# Patient Record
Sex: MALE | HISPANIC OR LATINO | Employment: FULL TIME | URBAN - METROPOLITAN AREA
[De-identification: names, ages, dates, MRNs, and addresses within clinical notes are randomized per-mention and may not be internally consistent; named-entity substitution may affect disease eponyms.]

---

## 2017-04-20 ENCOUNTER — NON-PROVIDER VISIT (OUTPATIENT)
Dept: URGENT CARE | Facility: PHYSICIAN GROUP | Age: 22
End: 2017-04-20

## 2017-04-20 DIAGNOSIS — Z02.1 PRE-EMPLOYMENT DRUG SCREENING: ICD-10-CM

## 2017-04-20 LAB
AMP AMPHETAMINE: NORMAL
COC COCAINE: NORMAL
INT CON NEG: NEGATIVE
INT CON POS: POSITIVE
MET METHAMPHETAMINES: NORMAL
OPI OPIATES: NORMAL
PCP PHENCYCLIDINE: NORMAL
POC DRUG COMMENT 753798-OCCUPATIONAL HEALTH: NORMAL
THC: NORMAL

## 2017-04-20 PROCEDURE — 80305 DRUG TEST PRSMV DIR OPT OBS: CPT | Performed by: EMERGENCY MEDICINE

## 2018-04-04 ENCOUNTER — HOSPITAL ENCOUNTER (OUTPATIENT)
Facility: MEDICAL CENTER | Age: 23
End: 2018-04-04
Attending: INTERNAL MEDICINE
Payer: COMMERCIAL

## 2018-04-04 ENCOUNTER — OFFICE VISIT (OUTPATIENT)
Dept: URGENT CARE | Facility: PHYSICIAN GROUP | Age: 23
End: 2018-04-04
Payer: COMMERCIAL

## 2018-04-04 VITALS
HEIGHT: 71 IN | OXYGEN SATURATION: 98 % | WEIGHT: 216 LBS | SYSTOLIC BLOOD PRESSURE: 114 MMHG | DIASTOLIC BLOOD PRESSURE: 68 MMHG | HEART RATE: 78 BPM | BODY MASS INDEX: 30.24 KG/M2

## 2018-04-04 DIAGNOSIS — N34.2 URETHRITIS: ICD-10-CM

## 2018-04-04 LAB
APPEARANCE UR: CLEAR
BILIRUB UR STRIP-MCNC: NEGATIVE MG/DL
COLOR UR AUTO: NORMAL
GLUCOSE UR STRIP.AUTO-MCNC: NEGATIVE MG/DL
KETONES UR STRIP.AUTO-MCNC: NEGATIVE MG/DL
LEUKOCYTE ESTERASE UR QL STRIP.AUTO: NEGATIVE
NITRITE UR QL STRIP.AUTO: NEGATIVE
PH UR STRIP.AUTO: 6 [PH] (ref 5–8)
PROT UR QL STRIP: NEGATIVE MG/DL
RBC UR QL AUTO: NEGATIVE
SP GR UR STRIP.AUTO: 1.02
UROBILINOGEN UR STRIP-MCNC: NEGATIVE MG/DL

## 2018-04-04 PROCEDURE — 87591 N.GONORRHOEAE DNA AMP PROB: CPT

## 2018-04-04 PROCEDURE — 99000 SPECIMEN HANDLING OFFICE-LAB: CPT | Performed by: INTERNAL MEDICINE

## 2018-04-04 PROCEDURE — 99203 OFFICE O/P NEW LOW 30 MIN: CPT | Performed by: INTERNAL MEDICINE

## 2018-04-04 PROCEDURE — 87491 CHLMYD TRACH DNA AMP PROBE: CPT

## 2018-04-04 PROCEDURE — 81002 URINALYSIS NONAUTO W/O SCOPE: CPT | Performed by: INTERNAL MEDICINE

## 2018-04-04 ASSESSMENT — ENCOUNTER SYMPTOMS
PALPITATIONS: 0
NAUSEA: 0
WEIGHT LOSS: 0
COUGH: 0
HEADACHES: 0
SORE THROAT: 0
DIZZINESS: 0
EYES NEGATIVE: 1
SHORTNESS OF BREATH: 0
DIARRHEA: 0
FEVER: 0
CHILLS: 0
VOMITING: 0
BLOOD IN STOOL: 0
MYALGIAS: 0
CONSTITUTIONAL NEGATIVE: 1

## 2018-04-05 LAB
C TRACH DNA SPEC QL NAA+PROBE: NEGATIVE
N GONORRHOEA DNA SPEC QL NAA+PROBE: NEGATIVE
SPECIMEN SOURCE: NORMAL

## 2018-04-05 NOTE — PROGRESS NOTES
"Shady Upton is a 23 y.o. male who presents for Exposure to STD (Was Dx with STI X 3 months ago, having reoccuring symptoms )       Patient is a 23-year-old male who presents today with penile discharge. Patient was recently diagnosed with chlamydia. Patient had completed a course of Zithromax and now has recurrent type symptoms. This is been several months after the initial diagnosis. Patient states that he has not had unprotected sex. Patient denies any fever or shaking chills. No other symptoms.      PMH:  has no past medical history on file.  MEDS:   Current Outpatient Prescriptions:   •  azithromycin (ZMAX) 2 GM suspension, Take 2 g by mouth Once for 1 dose., Disp: 2 Each, Rfl: 0  ALLERGIES: No Known Allergies  SURGHX: History reviewed. No pertinent surgical history.  SOCHX:  reports that he has never smoked. He has never used smokeless tobacco.  FH: family history is not on file.    Review of Systems   Constitutional: Negative.  Negative for chills, fever and weight loss.   HENT: Negative for sore throat.    Eyes: Negative.    Respiratory: Negative for cough and shortness of breath.    Cardiovascular: Negative for chest pain, palpitations and leg swelling.   Gastrointestinal: Negative for blood in stool, diarrhea, nausea and vomiting.   Genitourinary: Negative for dysuria, frequency and urgency.   Musculoskeletal: Negative for myalgias.   Skin: Negative for rash.   Neurological: Negative for dizziness (negative headache) and headaches.     No Known Allergies   Objective:   /68   Pulse 78   Ht 1.803 m (5' 11\")   Wt 98 kg (216 lb)   SpO2 98%   BMI 30.13 kg/m²   Physical Exam   Constitutional: He is oriented to person, place, and time. He appears well-developed and well-nourished. He is active. No distress.   HENT:   Head: Normocephalic and atraumatic.   Right Ear: External ear normal.   Left Ear: External ear normal.   Mouth/Throat: Oropharynx is clear and moist. No oropharyngeal exudate. "   Eyes: Conjunctivae and EOM are normal. Pupils are equal, round, and reactive to light. Right eye exhibits no discharge. Left eye exhibits no discharge. No scleral icterus.   Neck: Normal range of motion. Neck supple. No JVD present. Carotid bruit is not present. No thyroid mass and no thyromegaly present.   Cardiovascular: Normal rate, regular rhythm, S1 normal, S2 normal and normal heart sounds.  Exam reveals no friction rub.    No murmur heard.  Pulmonary/Chest: Effort normal and breath sounds normal. No respiratory distress. He has no wheezes. He has no rales.   Abdominal: Soft. Bowel sounds are normal. He exhibits no distension and no mass. There is no hepatosplenomegaly. There is no tenderness. There is no rebound and no guarding.   Genitourinary: Testes normal. Uncircumcised. Discharge (watery) found.   Musculoskeletal: Normal range of motion. He exhibits no edema.        Cervical back: Normal.   Lymphadenopathy:        Head (right side): No submental, no submandibular and no occipital adenopathy present.        Head (left side): No submental, no submandibular and no occipital adenopathy present.     He has no cervical adenopathy.   Neurological: He is alert and oriented to person, place, and time. He has normal strength. No cranial nerve deficit.   Skin: Skin is warm and dry. No rash noted. No erythema.   Psychiatric: He has a normal mood and affect. His behavior is normal. Thought content normal.         Assessment/Plan:   Assessment    1. Urethritis  - azithromycin (ZMAX) 2 GM suspension; Take 2 g by mouth Once for 1 dose.  Dispense: 2 Each; Refill: 0  - POCT Urinalysis  - CHLAMYDIA/GC PCR URINE OR SWAB; Future  Differential diagnosis, natural history, supportive care, and indications for immediate follow-up discussed.  Patient refrain from any unprotected sex. Patient to use condoms.  We'll treat empirically. Cultures sent.

## 2018-04-10 ENCOUNTER — TELEPHONE (OUTPATIENT)
Dept: URGENT CARE | Facility: PHYSICIAN GROUP | Age: 23
End: 2018-04-10

## 2018-04-10 NOTE — TELEPHONE ENCOUNTER
1. Caller Name: Shady Upton                                         Call Back Number: [phone] (home)       Patient approves a detailed voicemail message: yes    Pt called stating that when he went to  his medication from the pharmacy they did not have it.  It needed to be special ordered and take a week to get it.  The patient checked back today and the pharmacy told him that the medication had termed out.  His is asking if we could please resend the Rx to the pharmacy so they can fill it.  Please Advise.  Thanks

## 2018-04-16 NOTE — TELEPHONE ENCOUNTER
Pt called again to day to check on status of the medication.  I called over to the pharmacy and they stated that the medication prescribed is not available and has been discontinued so they were unable to fill it.  They state we need to send over a new Rx for him.  Pt states he tested positive for chlamydia and was prescribed the liquid because he was already treated from [facility] that did not work.   He is able to take pills and does not need liquid if there is something else that can be called in.

## 2018-04-18 ENCOUNTER — TELEPHONE (OUTPATIENT)
Dept: URGENT CARE | Facility: PHYSICIAN GROUP | Age: 23
End: 2018-04-18

## 2018-04-18 NOTE — TELEPHONE ENCOUNTER
I discussed pt's negative lab results and prescriptions with Dr. Lindsey. At his request I called pt and gave him the results and advised him he did not need to take antibiotics.     Pt was also advised that should symptoms present again he would need to be reevaluated.     Pt stated he understood and had no further questions.

## 2018-12-17 ENCOUNTER — NON-PROVIDER VISIT (OUTPATIENT)
Dept: URGENT CARE | Facility: PHYSICIAN GROUP | Age: 23
End: 2018-12-17

## 2018-12-17 DIAGNOSIS — Z02.1 PRE-EMPLOYMENT DRUG SCREENING: ICD-10-CM

## 2018-12-17 LAB
AMP AMPHETAMINE: NORMAL
COC COCAINE: NORMAL
INT CON NEG: NEGATIVE
INT CON POS: POSITIVE
MET METHAMPHETAMINES: NORMAL
OPI OPIATES: NORMAL
PCP PHENCYCLIDINE: NORMAL
POC DRUG COMMENT 753798-OCCUPATIONAL HEALTH: NEGATIVE
THC: NORMAL

## 2018-12-17 PROCEDURE — 80305 DRUG TEST PRSMV DIR OPT OBS: CPT | Performed by: FAMILY MEDICINE

## 2019-04-16 ENCOUNTER — OCCUPATIONAL MEDICINE (OUTPATIENT)
Dept: URGENT CARE | Facility: PHYSICIAN GROUP | Age: 24
End: 2019-04-16
Payer: COMMERCIAL

## 2019-04-16 VITALS
WEIGHT: 210 LBS | HEIGHT: 70 IN | SYSTOLIC BLOOD PRESSURE: 128 MMHG | DIASTOLIC BLOOD PRESSURE: 66 MMHG | HEART RATE: 80 BPM | OXYGEN SATURATION: 98 % | TEMPERATURE: 98.7 F | BODY MASS INDEX: 30.06 KG/M2 | RESPIRATION RATE: 16 BRPM

## 2019-04-16 DIAGNOSIS — S61.012A LACERATION OF LEFT THUMB WITHOUT FOREIGN BODY WITHOUT DAMAGE TO NAIL, INITIAL ENCOUNTER: ICD-10-CM

## 2019-04-16 DIAGNOSIS — Z02.1 PRE-EMPLOYMENT DRUG SCREENING: ICD-10-CM

## 2019-04-16 LAB
AMP AMPHETAMINE: NORMAL
COC COCAINE: NORMAL
INT CON NEG: NORMAL
INT CON POS: NORMAL
MET METHAMPHETAMINES: NORMAL
OPI OPIATES: NORMAL
PCP PHENCYCLIDINE: NORMAL
POC DRUG COMMENT 753798-OCCUPATIONAL HEALTH: NEGATIVE
THC: NORMAL

## 2019-04-16 PROCEDURE — 12002 RPR S/N/AX/GEN/TRNK2.6-7.5CM: CPT | Mod: 29 | Performed by: PHYSICIAN ASSISTANT

## 2019-04-16 PROCEDURE — 80305 DRUG TEST PRSMV DIR OPT OBS: CPT | Mod: 29 | Performed by: PHYSICIAN ASSISTANT

## 2019-04-16 ASSESSMENT — ENCOUNTER SYMPTOMS
NEUROLOGICAL NEGATIVE: 1
ROS SKIN COMMENTS: LAC, SEE HPI
CONSTITUTIONAL NEGATIVE: 1
BRUISES/BLEEDS EASILY: 0

## 2019-04-16 NOTE — LETTER
"EMPLOYEE’S CLAIM FOR COMPENSATION/ REPORT OF INITIAL TREATMENT  FORM C-4    EMPLOYEE’S CLAIM - PROVIDE ALL INFORMATION REQUESTED   First Name  Shady Last Name  Alexis Upton Birthdate                    1995                Sex  male Claim Number   Home Address  [address] Age  24 y.o. 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 (if applicable)  cutting fabric material     How did this injury or occupational disease occur? (Be specific an answer in detail. Use additional sheet if necessary)  while cutting fabric material, I was holding on to the wood part of a cutting board, then I dont know what happend    If you believe that you have an occupational disease, when did you first have knowledge of the disability and it relationship to your employment?  N/A Witnesses to the Accident  None      Nature of Injury or Occupational Disease  Laceration  Part(s) of Body Injured or Affected  Hand (L), Thumb (L), N/A    I certify that the above is true and correct to the best of my knowledge and that I have provided this information in order " to obtain the benefits of [place]  I hereby authorize any physician, chiropractor, surgeon, practitioner, or other person, any hospital, including [facility] or [facility] hospital, any medical service organization, any insurance company, or other institution or organization to release to each other, any medical or other information, including benefits paid or payable, pertinent to this injury or disease, except information relative to diagnosis, treatment and/or counseling for AIDS, psychological conditions, alcohol or controlled substances, for which I must give specific authorization.  A Photostat of this authorization shall be as valid as the original.     Date   Place   Employee’s Signature   THIS REPORT MUST BE COMPLETED AND MAILED WITHIN 3 WORKING DAYS OF TREATMENT   Place  [facility]  Name of Facility  [place]   Date  4/16/2019 Diagnosis  (S61.012A) Laceration of left thumb without foreign body without damage to nail, initial encounter Is there evidence the injured employee was under the influence of alcohol and/or another controlled substance at the time of accident?   Hour  4:52 PM Description of Injury or Disease  The encounter diagnosis was Laceration of left thumb without foreign body without damage to nail, initial encounter. No   Treatment  -lac repair, #6 4-0 interrupted sutures with good wound approx.   -education and care discussed for home.   -tetanus UTD  -RTC in 4 days for wound check, possible full duty release for Monday.  Sooner if needed for any concerns, complications.    -signs and symptoms of infection discussed in detail.   Have you advised the patient to remain off work five days or more? No   X-Ray Findings      If Yes   From Date  To Date      From information given by the employee, together with medical evidence, can you directly connect this  "injury or occupational disease as job incurred?  Yes If No Full Duty  No Modified Duty  Yes   Is additional medical care by a physician indicated?  Yes If Modified Duty, Specify any Limitations / Restrictions  SEE D-39   Do you know of any previous injury or disease contributing to this condition or occupational disease?                            No   Date  4/16/2019 Print Doctor’s Name Primo Woodson P.A.-C. I certify the employer’s copy of  this form was mailed on:   Address  [address] [place] [address]. [address] Insurer’s Use Only     [facility]  [id number]    Provider’s Tax ID Number  [id number] Telephone  Dept: [phone]        e-PRIMO Lieberman P.A.-C.   e-Signature: Dr. Pankaj Johns, Medical Director Degree  ANA        ORIGINAL-TREATING PHYSICIAN OR CHIROPRACTOR    PAGE 2-INSURER/TPA    PAGE 3-EMPLOYER    PAGE 4-EMPLOYEE             Form C-4 (rev10/07)              BRIEF DESCRIPTION OF RIGHTS AND BENEFITS  (Pursuant to [place]RS 616C.050)    Notice of Injury or Occupational Disease (Incident Report Form C-1): If an injury or occupational disease (OD) arises out of and in the  course of employment, you must provide written notice to your employer as soon as practicable, but no later than 7 days after the accident or  OD. Your employer shall maintain a sufficient supply of the required forms.    Claim for Compensation (Form C-4): If medical treatment is sought, the form C-4 is available at the place of initial treatment. A completed  \"Claim for Compensation\" (Form C-4) must be filed within 90 days after an accident or OD. The treating physician or chiropractor must,  within 3 working days after treatment, complete and mail to the employer, the employer's insurer and third-party , the Claim for  Compensation.    Medical Treatment: If you require medical treatment for your on-the-job injury or OD, you may be required to select a physician or  chiropractor from a list provided by your " workers’ compensation insurer, if it has contracted with an Organization for Managed Care (MCO) or  Preferred Provider Organization (PPO) or providers of health care. If your employer has not entered into a contract with an MCO or PPO, you  may select a physician or chiropractor from the Panel of Physicians and Chiropractors. Any medical costs related to your industrial injury or  OD will be paid by your insurer.    Temporary Total Disability (TTD): If your doctor has certified that you are unable to work for a period of at least 5 consecutive days, or 5  cumulative days in a 20-day period, or places restrictions on you that your employer does not accommodate, you may be entitled to TTD  compensation.    Temporary Partial Disability (TPD): If the wage you receive upon reemployment is less than the compensation for TTD to which you are  entitled, the insurer may be required to pay you TPD compensation to make up the difference. TPD can only be paid for a maximum of 24  months.    Permanent Partial Disability (PPD): When your medical condition is stable and there is an indication of a PPD as a result of your injury or  OD, within 30 days, your insurer must arrange for an evaluation by a rating physician or chiropractor to determine the degree of your PPD. The  amount of your PPD award depends on the date of injury, the results of the PPD evaluation and your age and wage.    Permanent Total Disability (PTD): If you are medically certified by a treating physician or chiropractor as permanently and totally disabled  and have been granted a PTD status by your insurer, you are entitled to receive monthly benefits not to exceed 66 2/3% of your average  monthly wage. The amount of your PTD payments is subject to reduction if you previously received a PPD award.    Vocational Rehabilitation Services: You may be eligible for vocational rehabilitation services if you are unable to return to the job due to a  permanent physical  impairment or permanent restrictions as a result of your injury or occupational disease.    Transportation and Per Renee Reimbursement: You may be eligible for travel expenses and per renee associated with medical treatment.    Reopening: You may be able to reopen your claim if your condition worsens after claim closure.    Appeal Process: If you disagree with a written determination issued by the insurer or the insurer does not respond to your request, you may  appeal to the Department of Administration, , by following the instructions contained in your determination letter. You must  appeal the determination within 70 days from the date of the determination letter at [address], [address], [place], [place]  [address], or [address], [address], [place] [address]. If you disagree with the  decision, you may appeal to the  Department of Administration, . You must file your appeal within 30 days from the date of the  decision  letter at [address], [address], [place], [place] [address], or [address], [place] [address]. If you  disagree with a decision of an , you may file a petition for judicial review with the District Court. You must do so within 30  days of the Appeal Officer’s decision. You may be represented by an  at your own expense or you may contact the [facility] for possible  representation.    [place]  for Injured Workers (NAIW): If you disagree with a  decision, you may request that NAIW represent you  without charge at an  Hearing. For information regarding denial of benefits, you may contact the [facility] at: [address], [address] [address], [phone], or [address], [place] [address], [phone]    To File a Complaint with the Division: If you wish to file a complaint with  the  of the Division of Industrial Relations (DIR),  please contact the Workers’ Compensation Section, [address], [address], [place], [place] [address], telephone ([phone]) [phone], or  [address], [address], [place], [place] [address], telephone [phone]) [phone].    For assistance with Workers’ Compensation Issues: you may contact the Office of the Governor Consumer Health Assistance, [address] [place] [address], [address], [place], [place] [address], Toll Free 1-451.904.8068, Web site: [email or web address] [email or web address]., E-mail  [email or web address]                                                                                                                                                                                                                                   __________________________________________________________________                                                                   _________________                Employee Name / Signature                                                                                                                                                       Date                                                                                                                                                                                                     D-2 (rev. 10/07)

## 2019-04-16 NOTE — LETTER
[facility]  [address] [place] [address]. [address] - [place] [address]  Phone:  [phone] - Fax:  [phone]   [facility] Progress Report and Disability Certification  Date of Service: 4/16/2019   No Show:  No  Date / Time of Next Visit: 4/20/2019 at 11:00 AM   Claim Information   Patient Name: Shady Upton  Claim Number:     Employer: [employer]  Date of Injury: 4/16/2019     Insurer / TPA:    ID / SSN:     Occupation: Manufactorer   Diagnosis: The encounter diagnosis was Laceration of left thumb without foreign body without damage to nail, initial encounter.    Medical Information   Related to Industrial Injury? Yes    Subjective Complaints:  DOI: 04/16/19.  1530.  Left thumb.    While cutting fabric material the patient was holding a wood part of a cutting board and states his hand slipped and cut the base of his thumb.  He had immediate pain and quite a bit of bleeding that was attempted to be controlled on site with wraps and pressure.  He notes that he has FROM of the thumb, no numbness/tingling or weakness.   He is UTD on his tetanus.    Objective Findings: Vitals reviewed  Left thumb/hand:  Mildly bleeding 2.5 cm thumb dorsal-medial thumb just distal to MCP joint.  No evidence of tendon involvement on examination. Patient has full ROM of thumb including flexion, extension, and thumb opposition with resistance.  Distal CMS WNL   Pre-Existing Condition(s):     Assessment:   Initial Visit    Status: Additional Care Required  Permanent Disability:No    Plan:      Diagnostics:      Comments:       Disability Information   Status: Released to Restricted Duty    From:  4/16/2019  Through: 4/20/2019 Restrictions are: Temporary   Physical Restrictions   Sitting:    Standing:    Stooping:    Bending:      Squatting:    Walking:    Climbing:    Pushing:      Pulling:    Other:    Reaching Above Shoulder (L):   Reaching Above Shoulder (R):      Reaching Below Shoulder (L):    Reaching Below Shoulder (R):      Not to exceed Weight Limits   Carrying(hrs):   Weight Limit(lb): < or = to 10 pounds Lifting(hrs):   Weight  Limit(lb): < or = to 10 pounds   Comments: -lac repair, #6 4-0 interrupted sutures with good wound approx.   -education and care discussed for home.   -tetanus UTD  -RTC in 4 days for wound check, possible full duty release for Monday.  Sooner if needed for any concerns, complications.    -signs and symptoms of infection discussed in detail.     Repetitive Actions   Hands: i.e. Fine Manipulations from [place] hrs/day  Comments:LEFT HAND   Feet: i.e. Operating Foot Controls:     Driving / Operate Machinery:     Physician Name: Primo Woodson P.A.-C. Physician Signature: PRIMO William P.A.-C. e-Signature: Dr. Pankaj Johns, Medical Director   Clinic Name / Location: [address]  [place] [address] Clinic Phone Number: Dept: [phone]   Appointment Time: 4:10 Pm Visit Start Time: 4:52 PM   Check-In Time:  4:20 Pm Visit Discharge Time: 5:35 PM   Original-Treating Physician or Chiropractor    Page 2-Insurer/TPA    Page 3-Employer    Page 4-Employee

## 2019-04-17 NOTE — PROGRESS NOTES
"Subjective:      Shady Upton is a 24 y.o. male who presents with Laceration (Left thumb)      DOI: 04/16/19.  1530.  Left thumb.    While cutting fabric material the patient was holding a wood part of a cutting board and states his hand slipped and cut the base of his thumb.  He had immediate pain and quite a bit of bleeding that was attempted to be controlled on site with wraps and pressure.  He notes that he has FROM of the thumb, no numbness/tingling or weakness.   He is UTD on his tetanus.      Laceration      as above.     Review of Systems   Constitutional: Negative.    Musculoskeletal:        SEE HPI   Skin:        LAC, SEE HPI   Neurological: Negative.    Endo/Heme/Allergies: Does not bruise/bleed easily.       PMH:  has no past medical history on file.  MEDS: No current outpatient prescriptions on file.  ALLERGIES: No Known Allergies  SURGHX: No past surgical history on file.  SOCHX:  reports that he has never smoked. He has never used smokeless tobacco.  FH: Family history was reviewed, no pertinent findings to report   Objective:     /66   Pulse 80   Temp 37.1 °C (98.7 °F) (Temporal)   Resp 16   Ht 1.778 m (5' 10\")   Wt 95.3 kg (210 lb)   SpO2 98%   BMI 30.13 kg/m²      Physical Exam   Constitutional: He is oriented to person, place, and time. He appears well-developed and well-nourished. No distress.   Cardiovascular: Normal rate.    Pulmonary/Chest: Effort normal.   Neurological: He is alert and oriented to person, place, and time. Abnormal reflex:    Abnormal coordination:      Skin: Skin is warm and dry.   Psychiatric: He has a normal mood and affect. His behavior is normal.     Vitals reviewed  Left thumb/hand:  Mildly bleeding 2.5 cm thumb dorsal-medial thumb just distal to MCP joint.  No evidence of tendon involvement on examination. Patient has full ROM of thumb including flexion, extension, and thumb opposition with resistance.  Distal CMS WNL       Assessment/Plan:     1. " Laceration of left thumb without foreign body without damage to nail, initial encounter           -lac repair, #6 4-0 interrupted sutures with good wound approx.   -education and care discussed for home.   -tetanus UTD  -RTC in 4 days for wound check, possible full duty release for Monday.  Sooner if needed for any concerns, complications.    -signs and symptoms of infection discussed in detail.       Daphne Woodson P.A.-C.

## 2019-04-20 ENCOUNTER — OCCUPATIONAL MEDICINE (OUTPATIENT)
Dept: URGENT CARE | Facility: PHYSICIAN GROUP | Age: 24
End: 2019-04-20
Payer: COMMERCIAL

## 2019-04-20 VITALS
TEMPERATURE: 98.8 F | OXYGEN SATURATION: 96 % | HEIGHT: 70 IN | DIASTOLIC BLOOD PRESSURE: 78 MMHG | HEART RATE: 60 BPM | WEIGHT: 211.4 LBS | BODY MASS INDEX: 30.26 KG/M2 | SYSTOLIC BLOOD PRESSURE: 124 MMHG

## 2019-04-20 DIAGNOSIS — S61.012D LACERATION OF LEFT THUMB WITHOUT FOREIGN BODY WITHOUT DAMAGE TO NAIL, SUBSEQUENT ENCOUNTER: ICD-10-CM

## 2019-04-20 PROCEDURE — 99024 POSTOP FOLLOW-UP VISIT: CPT | Performed by: PHYSICIAN ASSISTANT

## 2019-04-20 NOTE — LETTER
[facility]  [address] - [place] [address]  Phone:  [phone] - Fax:  [phone]   [facility] Progress Report and Disability Certification  Date of Service: 4/20/2019   No Show:  No  Date / Time of Next Visit: 4/23/2019@9:00 AM   Claim Information   Patient Name: Shady Upton  Claim Number:     Employer: [employer]  Date of Injury: 4/16/2019     Insurer / TPA: Ambar Pelletier  ID / SSN:     Occupation: Manufactorer   Diagnosis: The encounter diagnosis was Laceration of left thumb without foreign body without damage to nail, subsequent encounter.    Medical Information   Related to Industrial Injury? Yes    Subjective Complaints:  DOI 4/16/2019:      4/20/2019 Follow-up visit: Patient returns today for wound check.  He reports FROM of left thumb with no numbness/tingling.  He has noticed some occasional scant clear discharge from one edge of the wound but no surrounding erythema and very minimal pain. No fever/chills. No new injury.  Patient is right-hand dominant.   Objective Findings: Left thumb/hand: Well approximated laceration with sutures in place on the palmar aspects of left thumb just distal to the MCP joint.  Patient has full ROM of thumb.  No surrounding erythema.  No drainage from the wound.  Sensation intact.  Cap refill less than 2 seconds.   Pre-Existing Condition(s):     Assessment:   Condition Improved    Status: Additional Care Required  Permanent Disability:No    Plan:      Diagnostics:      Comments:       Disability Information   Status: Released to Restricted Duty    From:  4/20/2019  Through: 4/23/2019 Restrictions are:     Physical Restrictions   Sitting:    Standing:    Stooping:    Bending:      Squatting:    Walking:    Climbing:    Pushing:      Pulling:    Other:    Reaching Above Shoulder (L):   Reaching Above Shoulder (R):       Reaching Below Shoulder (L):    Reaching Below Shoulder  (R):      Not to exceed Weight Limits   Carrying(hrs):   Weight Limit(lb): < or = to 10 pounds  Comments:left hand Lifting(hrs):   Weight  Limit(lb): < or = to 10 pounds  Comments:left hand   Comments: -Continue current work restrictions per day 39  -Return in 4 days for suture removal, expect release to full duty at that time      Repetitive Actions   Hands: i.e. Fine Manipulations from Grasping:     Feet: i.e. Operating Foot Controls:     Driving / Operate Machinery:     Physician Name: Derick Gomez P.A.-C. Physician Signature: DERICK Rangel P.A.-C. e-Signature: Dr. Pankaj Johns, Medical Director   Clinic Name / Location: [address]  [place] [address] Clinic Phone Number: Dept: [phone]   Appointment Time: 10:45 Am Visit Start Time: 10:54 AM   Check-In Time:  10:44 Am Visit Discharge Time: 11:33 AM   Original-Treating Physician or Chiropractor    Page 2-Insurer/TPA    Page 3-Employer    Page 4-Employee

## 2019-04-20 NOTE — PROGRESS NOTES
Chief Complaint   Patient presents with   • Work-Related Injury     WC FV L hand Laceration, pt states that there was clear discharge but other than that they feel good      DOI: 04/16/19    INITIAL HPI COPIED: DOI: 04/16/19.  1530.  Left thumb.    While cutting fabric material the patient was holding a wood part of a cutting board and states his hand slipped and cut the base of his thumb.  He had immediate pain and quite a bit of bleeding that was attempted to be controlled on site with wraps and pressure.  He notes that he has FROM of the thumb, no numbness/tingling or weakness.   He is UTD on his tetanus.        Physical exam:   Left thumb/hand: Well approximated laceration with sutures in place on the palmar aspects of left thumb just distal to the MCP joint.  Patient has full ROM of thumb.  No surrounding erythema.  No drainage from the wound.  Sensation intact.  Cap refill less than 2 seconds.      1. Laceration of left thumb without foreign body without damage to nail, subsequent encounter  -Continue current work restrictions per day 39  -Return in 4 days for suture removal

## 2019-04-20 NOTE — LETTER
[facility]  [address] - [place] [address]  Phone:  [phone] - Fax:  [phone]   [facility] Progress Report and Disability Certification  Date of Service: 4/20/2019   No Show:  No  Date / Time of Next Visit: 4/23/2019@9:00 AM   Claim Information   Patient Name: Shady Upton  Claim Number:     Employer: [employer]  Date of Injury: 4/16/2019     Insurer / TPA: Ambar Pelletier  ID / SSN:     Occupation: Manufactorer   Diagnosis: The encounter diagnosis was Laceration of left thumb without foreign body without damage to nail, subsequent encounter.    Medical Information   Related to Industrial Injury? Yes    Subjective Complaints:  DOI 4/16/2019:      4/20/2019 Follow-up visit: Patient returns today for wound check.  He reports FROM of left thumb with no numbness/tingling.  He has noticed some occasional scant clear discharge from one edge of the wound but no surrounding erythema and very minimal pain. No fever/chills. No new injury.  Patient is right-hand dominant.   Objective Findings: Left thumb/hand: Well approximated laceration with sutures in place on the palmar aspects of left thumb just distal to the MCP joint.  Patient has full ROM of thumb.  No surrounding erythema.  No drainage from the wound.  Sensation intact.  Cap refill less than 2 seconds.   Pre-Existing Condition(s):     Assessment:   Condition Improved    Status: Additional Care Required  Permanent Disability:No    Plan:      Diagnostics:      Comments:       Disability Information   Status: Released to Restricted Duty    From:  4/20/2019  Through: 4/22/2019 Restrictions are:     Physical Restrictions   Sitting:    Standing:    Stooping:    Bending:      Squatting:    Walking:    Climbing:    Pushing:      Pulling:    Other:    Reaching Above Shoulder (L):   Reaching Above Shoulder (R):       Reaching Below Shoulder (L):    Reaching Below Shoulder  (R):      Not to exceed Weight Limits   Carrying(hrs):   Weight Limit(lb): < or = to 10 pounds  Comments:left hand Lifting(hrs):   Weight  Limit(lb): < or = to 10 pounds  Comments:left hand   Comments: -Continue current work restrictions per day 39  -Return in 4 days for suture removal, expect release to full duty at that time      Repetitive Actions   Hands: i.e. Fine Manipulations from Grasping:     Feet: i.e. Operating Foot Controls:     Driving / Operate Machinery:     Physician Name: Derick Gomez P.A.-C. Physician Signature: DERICK Rangel P.A.-C. e-Signature: Dr. Pankaj Johns, Medical Director   Clinic Name / Location: [address]  [place] [address] Clinic Phone Number: Dept: [phone]   Appointment Time: 10:45 Am Visit Start Time: 10:54 AM   Check-In Time:  10:44 Am Visit Discharge Time: 11:28 AM   Original-Treating Physician or Chiropractor    Page 2-Insurer/TPA    Page 3-Employer    Page 4-Employee

## 2019-04-23 ENCOUNTER — OCCUPATIONAL MEDICINE (OUTPATIENT)
Dept: URGENT CARE | Facility: PHYSICIAN GROUP | Age: 24
End: 2019-04-23
Payer: COMMERCIAL

## 2019-04-23 VITALS
OXYGEN SATURATION: 95 % | WEIGHT: 211 LBS | HEIGHT: 70 IN | BODY MASS INDEX: 30.21 KG/M2 | TEMPERATURE: 98.5 F | DIASTOLIC BLOOD PRESSURE: 82 MMHG | HEART RATE: 62 BPM | SYSTOLIC BLOOD PRESSURE: 120 MMHG

## 2019-04-23 DIAGNOSIS — S61.012D LACERATION OF LEFT THUMB WITHOUT FOREIGN BODY WITHOUT DAMAGE TO NAIL, SUBSEQUENT ENCOUNTER: ICD-10-CM

## 2019-04-23 PROCEDURE — 99213 OFFICE O/P EST LOW 20 MIN: CPT | Mod: 29 | Performed by: PHYSICIAN ASSISTANT

## 2019-04-23 RX ORDER — CEPHALEXIN 500 MG/1
500 CAPSULE ORAL 3 TIMES DAILY
Qty: 21 CAP | Refills: 0 | Status: SHIPPED | OUTPATIENT
Start: 2019-04-23 | End: 2019-04-30

## 2019-04-23 ASSESSMENT — ENCOUNTER SYMPTOMS
ROS SKIN COMMENTS: SEE HPI
NEUROLOGICAL NEGATIVE: 1
CONSTITUTIONAL NEGATIVE: 1
MUSCULOSKELETAL NEGATIVE: 1

## 2019-04-23 NOTE — LETTER
[facility]  [address] [place] [address]. [address] - [place] [address]  Phone:  [phone] - Fax:  [phone]   [facility] Progress Report and Disability Certification  Date of Service: 4/23/2019   No Show:  No  Date / Time of Next Visit: 4/26/2019 @ 9:00 AM   Claim Information   Patient Name: Shady Upton  Claim Number:     Employer: [employer]  Date of Injury: 4/16/2019     Insurer / TPA: Ambar Pelletier  ID / SSN:     Occupation: Manufactorer   Diagnosis: The encounter diagnosis was Laceration of left thumb without foreign body without damage to nail, subsequent encounter.    Medical Information   Related to Industrial Injury? Yes    Subjective Complaints:  DOI:  04/16/19.   2nd follow up.   Patient feels it is healing well although he does have some new pain and redness around one of the suture sites.  Some concern for infection.  He has had some clear and yellowish drainage.  No numbness or tingling.   Tolerating light duty.    Objective Findings: Vitals reviewed  Left thumb: Sutures intact.  There is mild erythema and tenderness noted around two sutures without fluctuance or drainage.  No lymphangitis.    Pre-Existing Condition(s):     Assessment:   Condition Improved    Status: Additional Care Required  Permanent Disability:No    Plan:      Diagnostics:      Comments:       Disability Information   Status: Released to Restricted Duty    From:  4/23/2019  Through: 4/26/2019 Restrictions are:     Physical Restrictions   Sitting:    Standing:    Stooping:    Bending:      Squatting:    Walking:    Climbing:    Pushing:      Pulling:    Other:    Reaching Above Shoulder (L):   Reaching Above Shoulder (R):       Reaching Below Shoulder (L):    Reaching Below Shoulder (R):      Not to exceed Weight Limits   Carrying(hrs):   Weight Limit(lb): < or = to 10 pounds Lifting(hrs):   Weight  Limit(lb): < or = to 10 pounds   Comments: 3 sutures removed,  tolerated well.     Wound is healing well overall, there is slight increased erythema and mild tenderness in an area of the laceration.  No drainage.    Monitor, Keflex to pharmacy.    Will follow up in 4 days for likely MMI.     Repetitive Actions   Hands: i.e. Fine Manipulations from Graspin hrs/day  Comments:LEFT   Feet: i.e. Operating Foot Controls:     Driving / Operate Machinery:     Physician Name: Primo Woodson P.A.-C. Physician Signature: PRIMO William P.A.-C. e-Signature: Dr. Pankaj Johns, Medical Director   Clinic Name / Location: [address]  [place] [address] Clinic Phone Number: Dept: [phone]   Appointment Time: 9:00 Am Visit Start Time: 9:02 AM   Check-In Time:  8:55 Am Visit Discharge Time: 9:30 AM   Original-Treating Physician or Chiropractor    Page 2-Insurer/TPA    Page 3-Employer    Page 4-Employee

## 2019-04-23 NOTE — PROGRESS NOTES
"Subjective:      Shady Upton is a 24 y.o. male who presents with Hand Injury (WC FUV, L Thumb, red, pt states it hurts a little but not to bad)      DOI:  04/16/19.   2nd follow up.   Patient feels it is healing well although he does have some new pain and redness around one of the suture sites.  Some concern for infection.  He has had some clear and yellowish drainage.  No numbness or tingling.   Tolerating light duty.      Hand Injury     as above.     Review of Systems   Constitutional: Negative.    Musculoskeletal: Negative.    Skin:        SEE HPI   Neurological: Negative.    Endo/Heme/Allergies: Negative.        PMH:  has no past medical history on file.  MEDS:   Current Outpatient Prescriptions:   •  cephALEXin (KEFLEX) 500 MG Cap, Take 1 Cap by mouth 3 times a day for 7 days., Disp: 21 Cap, Rfl: 0  ALLERGIES: No Known Allergies  SURGHX: No past surgical history on file.  SOCHX:  reports that he has never smoked. He has never used smokeless tobacco. He reports that he does not drink alcohol or use drugs.  FH: Family history was reviewed, no pertinent findings to report   Objective:     /82 (BP Location: Left arm, Patient Position: Sitting, BP Cuff Size: Adult)   Pulse 62   Temp 36.9 °C (98.5 °F) (Temporal)   Ht 1.778 m (5' 10\")   Wt 95.7 kg (211 lb)   SpO2 95%   BMI 30.28 kg/m²      Physical Exam   Constitutional: He is oriented to person, place, and time. He appears well-developed and well-nourished. No distress.   Cardiovascular: Normal rate.    Pulmonary/Chest: Effort normal.   Neurological: He is alert and oriented to person, place, and time.   Skin: Skin is warm and dry.   Psychiatric: He has a normal mood and affect. His behavior is normal.     Vitals reviewed  Left thumb: Sutures intact.  There is mild erythema and tenderness noted around two sutures without fluctuance or drainage.  No lymphangitis.        Assessment/Plan:     1. Laceration of left thumb without foreign body without " damage to nail, subsequent encounter  cephALEXin (KEFLEX) 500 MG Cap       3 sutures removed, tolerated well.     Wound is healing well overall, there is slight increased erythema and mild tenderness in an area of the laceration.  No drainage.    Monitor, Keflex to pharmacy.    Will follow up in 4 days for likely MMI.         Daphne Woodson P.A.-C.

## 2019-04-26 ENCOUNTER — OCCUPATIONAL MEDICINE (OUTPATIENT)
Dept: URGENT CARE | Facility: PHYSICIAN GROUP | Age: 24
End: 2019-04-26
Payer: COMMERCIAL

## 2019-04-26 VITALS
BODY MASS INDEX: 30.21 KG/M2 | DIASTOLIC BLOOD PRESSURE: 70 MMHG | SYSTOLIC BLOOD PRESSURE: 116 MMHG | RESPIRATION RATE: 16 BRPM | TEMPERATURE: 98.9 F | OXYGEN SATURATION: 97 % | HEIGHT: 70 IN | HEART RATE: 65 BPM | WEIGHT: 211 LBS

## 2019-04-26 DIAGNOSIS — S61.012D LACERATION OF LEFT THUMB WITHOUT FOREIGN BODY WITHOUT DAMAGE TO NAIL, SUBSEQUENT ENCOUNTER: Primary | ICD-10-CM

## 2019-04-26 PROCEDURE — 99214 OFFICE O/P EST MOD 30 MIN: CPT | Mod: 29 | Performed by: PHYSICIAN ASSISTANT

## 2019-04-26 NOTE — LETTER
[facility]  [address] - [place] [address]  Phone:  [phone] - Fax:  [phone]   [facility] Progress Report and Disability Certification  Date of Service: 4/26/2019   No Show:  No  Date / Time of Next Visit:   D/C MMI   Claim Information   Patient Name: Shady Upton  Claim Number:     Employer: [employer]  Date of Injury: 4/16/2019     Insurer / TPA: Ambar Pelletier  ID / SSN:     Occupation: Manufactorer   Diagnosis: The encounter diagnosis was Laceration of left thumb without foreign body without damage to nail, subsequent encounter.    Medical Information   Related to Industrial Injury? Yes    Subjective Complaints:  DOI: 04/16/19.  1530.  Left thumb.   Pt here for 3rd follow up visit and 4th visit total. Wound well appearing with 3 retained sutures. Pt notes well healed and ready for D/C.  While cutting fabric material the patient was holding a wood part of a cutting board and states his hand slipped and cut the base of his thumb.  He had immediate pain and quite a bit of bleeding that was attempted to be controlled on site with wraps and pressure.  He notes that he has FROM of the thumb, no numbness/tingling or weakness.   He is UTD on his tetanus. Pt has not taken any Rx medications for this condition. Pt states the pain is a 0/10. Pt denies CP, SOB, NVD, paresthesias, headaches, dizziness, change in vision, hives, or other joint pain. The pt's medication list, problem list, and allergies have been evaluated and reviewed during today's visit.     Objective Findings: Left thumb: No ecchymoses, no edema, no erythema, no signs of infection present, No TTP. +AROM, +SILT, STR 5/5. 3 retained sutures     Pre-Existing Condition(s):     Assessment:   Condition Improved    Status: Discharged /  MMI  Permanent Disability:No    Plan:      Diagnostics:      Comments:       Disability Information   Status: Released to Full  Duty    From:     Through:   Restrictions are:     Physical Restrictions   Sitting:    Standing:    Stooping:    Bending:      Squatting:    Walking:    Climbing:    Pushing:      Pulling:    Other:    Reaching Above Shoulder (L):   Reaching Above Shoulder (R):       Reaching Below Shoulder (L):    Reaching Below Shoulder (R):      Not to exceed Weight Limits   Carrying(hrs):   Weight Limit(lb):   Lifting(hrs):   Weight  Limit(lb):     Comments: Remaining 3 sutures removed today and pt ready for full duty and D/C MMI    Repetitive Actions   Hands: i.e. Fine Manipulations from Grasping:     Feet: i.e. Operating Foot Controls:     Driving / Operate Machinery:     Physician Name: Sean Rubi P.A.-C. Physician Signature: SEAN Bunn P.A.-C. e-Signature: Dr. Pankaj Johns, Medical Director   Clinic Name / Location: [address]  [place] [address] Clinic Phone Number: Dept: [phone]   Appointment Time: 9:00 Am Visit Start Time: 9:05 AM   Check-In Time:  8:59 Am Visit Discharge Time:  9:40 AM    Original-Treating Physician or Chiropractor    Page 2-Insurer/TPA    Page 3-Employer    Page 4-Employee

## 2019-04-26 NOTE — PROGRESS NOTES
Subjective:      Pt is a 24 y.o. male who presents with Suture / Staple Removal ( follow up cut on left hand )      DOI: 04/16/19.  1530.  Left thumb.   Pt here for 3rd follow up visit and 4th visit total. Wound well appearing with 3 retained sutures. Pt notes well healed and ready for D/C.  While cutting fabric material the patient was holding a wood part of a cutting board and states his hand slipped and cut the base of his thumb.  He had immediate pain and quite a bit of bleeding that was attempted to be controlled on site with wraps and pressure.  He notes that he has FROM of the thumb, no numbness/tingling or weakness.   He is UTD on his tetanus. Pt has not taken any Rx medications for this condition. Pt states the pain is a 0/10. Pt denies CP, SOB, NVD, paresthesias, headaches, dizziness, change in vision, hives, or other joint pain. The pt's medication list, problem list, and allergies have been evaluated and reviewed during today's visit.       HPI  PMH:  Negative per pt.      PSH:  Negative per pt.      Fam Hx:  the patient's family history is not pertinent to their current complaint      Soc HX:  Social History     Social History   • Marital status: Single     Spouse name: N/A   • Number of children: N/A   • Years of education: N/A     Occupational History   • Not on file.     Social History Main Topics   • Smoking status: Never Smoker   • Smokeless tobacco: Never Used   • Alcohol use No   • Drug use: No   • Sexual activity: Yes     Partners: Male     Other Topics Concern   • Not on file     Social History Narrative   • No narrative on file         Medications:    Current Outpatient Prescriptions:   •  cephALEXin (KEFLEX) 500 MG Cap, Take 1 Cap by mouth 3 times a day for 7 days., Disp: 21 Cap, Rfl: 0      Allergies:  Patient has no known allergies.    ROS    Constitutional: Negative for fever, chills and malaise/fatigue.   HENT: Negative for congestion and sore throat.    Eyes: Negative for blurred  "vision, double vision and photophobia.   Respiratory: Negative for cough and shortness of breath.  Cardiovascular: Negative for chest pain and palpitations.   Gastrointestinal: Negative for heartburn, nausea, vomiting, abdominal pain, diarrhea and constipation.   Genitourinary: Negative for dysuria and flank pain.   Musculoskeletal: +left thumb lac  Skin: Negative for itching and rash.   Neurological: Negative for dizziness, tingling and headaches.   Endo/Heme/Allergies: Does not bruise/bleed easily.   Psychiatric/Behavioral: Negative for depression. The patient is not nervous/anxious.         Objective:     /70   Pulse 65   Temp 37.2 °C (98.9 °F) (Temporal)   Resp 16   Ht 1.778 m (5' 10\")   Wt 95.7 kg (211 lb)   SpO2 97%   BMI 30.28 kg/m²      Physical Exam    Left thumb: No ecchymoses, no edema, no erythema, no signs of infection present, No TTP. +AROM, +SILT, STR 5/5. 3 retained sutures    Constitutional: PT is oriented to person, place, and time. PT appears well-developed and well-nourished. No distress.   HENT:   Head: Normocephalic and atraumatic.   Mouth/Throat: Oropharynx is clear and moist. No oropharyngeal exudate.   Eyes: Conjunctivae normal and EOM are normal. Pupils are equal, round, and reactive to light.   Neck: Normal range of motion. Neck supple. No thyromegaly present.   Cardiovascular: Normal rate, regular rhythm, normal heart sounds and intact distal pulses.  Exam reveals no gallop and no friction rub.    No murmur heard.  Pulmonary/Chest: Effort normal and breath sounds normal. No respiratory distress. PT has no wheezes. PT has no rales. Pt exhibits no tenderness.   Abdominal: Soft. Bowel sounds are normal. PT exhibits no distension and no mass. There is no tenderness. There is no rebound and no guarding.   Neurological: PT is alert and oriented to person, place, and time. PT has normal reflexes. No cranial nerve deficit.   Skin: Skin is warm and dry. No rash noted. PT is not " diaphoretic. No erythema.       Psychiatric: PT has a normal mood and affect. PT behavior is normal. Judgment and thought content normal.        Assessment/Plan:     1. Laceration of left thumb without foreign body without damage to nail, subsequent encounter    3 simple sutures removed today as well.Wound well appearing  RICE therapy discussed  Gentle ROM exercises discussed  WBAT left hand  Ice/heat therapy discussed  OTC ibuprofen for pain control  Rest, fluids encouraged.  AVS with medical info given.  Pt was in full understanding and agreement with the plan.  Follow-up as needed if symptoms worsen or fail to improve.  D/C MMI

## 2022-02-22 ENCOUNTER — NON-PROVIDER VISIT (OUTPATIENT)
Dept: URGENT CARE | Facility: PHYSICIAN GROUP | Age: 27
End: 2022-02-22

## 2022-02-22 DIAGNOSIS — Z02.1 PRE-EMPLOYMENT DRUG SCREENING: Primary | ICD-10-CM

## 2022-02-22 PROCEDURE — 80305 DRUG TEST PRSMV DIR OPT OBS: CPT | Performed by: NURSE PRACTITIONER

## 2022-07-07 ENCOUNTER — APPOINTMENT (OUTPATIENT)
Dept: RADIOLOGY | Facility: IMAGING CENTER | Age: 27
End: 2022-07-07
Attending: NURSE PRACTITIONER
Payer: COMMERCIAL

## 2022-07-07 ENCOUNTER — OFFICE VISIT (OUTPATIENT)
Dept: URGENT CARE | Facility: PHYSICIAN GROUP | Age: 27
End: 2022-07-07

## 2022-07-07 VITALS
HEIGHT: 70 IN | RESPIRATION RATE: 20 BRPM | WEIGHT: 200 LBS | HEART RATE: 60 BPM | BODY MASS INDEX: 28.63 KG/M2 | SYSTOLIC BLOOD PRESSURE: 136 MMHG | OXYGEN SATURATION: 98 % | DIASTOLIC BLOOD PRESSURE: 82 MMHG | TEMPERATURE: 97.8 F

## 2022-07-07 DIAGNOSIS — R07.9 CHEST PAIN, UNSPECIFIED TYPE: ICD-10-CM

## 2022-07-07 DIAGNOSIS — F41.9 ANXIETY: ICD-10-CM

## 2022-07-07 PROCEDURE — 99214 OFFICE O/P EST MOD 30 MIN: CPT | Performed by: NURSE PRACTITIONER

## 2022-07-07 PROCEDURE — 93000 ELECTROCARDIOGRAM COMPLETE: CPT | Performed by: NURSE PRACTITIONER

## 2022-07-07 PROCEDURE — 71046 X-RAY EXAM CHEST 2 VIEWS: CPT | Mod: TC | Performed by: NURSE PRACTITIONER

## 2022-07-07 RX ORDER — HYDROXYZINE HYDROCHLORIDE 25 MG/1
25 TABLET, FILM COATED ORAL 3 TIMES DAILY PRN
Qty: 60 TABLET | Refills: 0 | Status: SHIPPED | OUTPATIENT
Start: 2022-07-07 | End: 2022-08-06

## 2022-07-07 ASSESSMENT — ENCOUNTER SYMPTOMS
MYALGIAS: 1
SENSORY CHANGE: 1
NERVOUS/ANXIOUS: 1
DIARRHEA: 0
FEVER: 0
CHILLS: 0
VOMITING: 0
ABDOMINAL PAIN: 0
HEADACHES: 0
SHORTNESS OF BREATH: 1
NAUSEA: 0

## 2022-07-07 NOTE — PROGRESS NOTES
Subjective:     Shady Upton is a 27 y.o. male who presents for Chest Pain (Left side, 3x wks ), Arm Problem (Left side, Pt states that finger tip gets numb 1x wk ), and Shortness of Breath      HPI  Pt presents for evaluation of a new problem.  Shady is a very pleasant 27-year-old male who presents to urgent care today with complaints of ongoing intermittent chest pain for the past 3 weeks.  He notes that his chest pain when it comes on is very brief and only last a few seconds.  His pain does worsen with movement of his left arm and notes that his pain does radiate down to his fingertips and causes numbness and tingling.  Associated symptoms include shortness of breath, decreased appetite and anxiety.  He states that he has been very stressed with lots of changes in his life recently.  He notes that he is very active and has no cardiac disease history in his family.  He does run and hike frequently.  He has not used any medication for the relief of his symptoms.     Review of Systems   Constitutional: Negative for chills and fever.   Respiratory: Positive for shortness of breath.    Cardiovascular: Positive for chest pain. Negative for leg swelling.   Gastrointestinal: Negative for abdominal pain, diarrhea, nausea and vomiting.   Musculoskeletal: Positive for myalgias.   Neurological: Positive for sensory change. Negative for headaches.   Psychiatric/Behavioral: The patient is nervous/anxious.        PMH: History reviewed. No pertinent past medical history.  ALLERGIES: No Known Allergies  SURGHX: History reviewed. No pertinent surgical history.  SOCHX:   Social History     Socioeconomic History   • Marital status: Single   Tobacco Use   • Smoking status: Never Smoker   • Smokeless tobacco: Never Used   Vaping Use   • Vaping Use: Some days   • Substances: THC   • Devices: Disposable, Pre-filled or refillable cartridge   Substance and Sexual Activity   • Alcohol use: No   • Drug use: Yes     Types: Marijuana  "  • Sexual activity: Yes     Partners: Male     FH: History reviewed. No pertinent family history.      Objective:   BP (!) 144/88 (BP Location: Right arm, Patient Position: Sitting, BP Cuff Size: Adult)   Pulse 60   Temp 36.6 °C (97.8 °F) (Temporal)   Resp 20   Ht 1.778 m (5' 10\")   Wt 90.7 kg (200 lb)   SpO2 98%   BMI 28.70 kg/m²     Physical Exam  Vitals and nursing note reviewed.   Constitutional:       General: He is not in acute distress.     Appearance: Normal appearance. He is normal weight. He is not ill-appearing.   HENT:      Head: Normocephalic and atraumatic.      Right Ear: External ear normal.      Left Ear: External ear normal.      Nose: No congestion or rhinorrhea.      Mouth/Throat:      Mouth: Mucous membranes are moist.   Eyes:      Extraocular Movements: Extraocular movements intact.      Pupils: Pupils are equal, round, and reactive to light.   Cardiovascular:      Rate and Rhythm: Regular rhythm. Bradycardia present.      Pulses: Normal pulses.      Heart sounds: Normal heart sounds. No murmur heard.    No friction rub. No gallop.   Pulmonary:      Effort: Pulmonary effort is normal. No respiratory distress.      Breath sounds: Normal breath sounds. No stridor. No wheezing, rhonchi or rales.   Chest:      Chest wall: No tenderness.   Abdominal:      General: Abdomen is flat. Bowel sounds are normal.      Palpations: Abdomen is soft.      Tenderness: There is no abdominal tenderness. There is no right CVA tenderness or left CVA tenderness.   Musculoskeletal:         General: Normal range of motion.      Cervical back: Normal range of motion and neck supple.   Skin:     General: Skin is warm and dry.      Capillary Refill: Capillary refill takes less than 2 seconds.   Neurological:      General: No focal deficit present.      Mental Status: He is alert and oriented to person, place, and time. Mental status is at baseline.   Psychiatric:         Mood and Affect: Mood normal.         " Behavior: Behavior normal.         Thought Content: Thought content normal.         Judgment: Judgment normal.     DX chest: IMPRESSION:     No radiographic evidence of acute cardiopulmonary disease.  ECG: sinus bradycardia rate of 56.  No previous ECG for comparison.  Assessment/Plan:   Assessment    1. Chest pain, unspecified type  DX-CHEST-2 VIEWS    EKG - Clinic Performed    REFERRAL TO CARDIOLOGY   2. Anxiety  hydrOXYzine HCl (ATARAX) 25 MG Tab   Differential diagnoses discussed with patient.  He is bradycardic however, he is an avid athlete and runner.  His pain is reproducible with movement of his left arm.  It is most likely that he is suffering from a strain causing this intermittent chest pain.  I did recommend using over-the-counter ibuprofen every 6 hours as well as refraining from heavy lifting and physical activity for the next week to see if this resolves his chest pain.  Additionally, he does suffer from anxiety due to stressful life events recently.  He was prescribed hydroxyzine for relief of anxiety.  Side effects discussed.  I did refer her to follow-up with cardiology if symptoms of chest pain persists or worsens.  Strict ER precautions given.  AVS handout given and reviewed with patient. Pt educated on red flags and when to seek treatment back in ER or UC.

## 2023-12-22 ENCOUNTER — OFFICE VISIT (OUTPATIENT)
Dept: URGENT CARE | Facility: PHYSICIAN GROUP | Age: 28
End: 2023-12-22
Payer: COMMERCIAL

## 2023-12-22 VITALS
WEIGHT: 224.2 LBS | OXYGEN SATURATION: 99 % | HEART RATE: 77 BPM | DIASTOLIC BLOOD PRESSURE: 80 MMHG | BODY MASS INDEX: 32.1 KG/M2 | TEMPERATURE: 98.4 F | RESPIRATION RATE: 16 BRPM | SYSTOLIC BLOOD PRESSURE: 120 MMHG | HEIGHT: 70 IN

## 2023-12-22 DIAGNOSIS — H00.021 HORDEOLUM INTERNUM OF RIGHT UPPER EYELID: ICD-10-CM

## 2023-12-22 PROCEDURE — 1125F AMNT PAIN NOTED PAIN PRSNT: CPT | Performed by: PHYSICIAN ASSISTANT

## 2023-12-22 PROCEDURE — 3079F DIAST BP 80-89 MM HG: CPT | Performed by: PHYSICIAN ASSISTANT

## 2023-12-22 PROCEDURE — 99213 OFFICE O/P EST LOW 20 MIN: CPT | Performed by: PHYSICIAN ASSISTANT

## 2023-12-22 PROCEDURE — 3074F SYST BP LT 130 MM HG: CPT | Performed by: PHYSICIAN ASSISTANT

## 2023-12-22 RX ORDER — DOXYCYCLINE HYCLATE 100 MG
100 TABLET ORAL 2 TIMES DAILY
Qty: 14 TABLET | Refills: 0 | Status: SHIPPED | OUTPATIENT
Start: 2023-12-22 | End: 2023-12-23

## 2023-12-22 RX ORDER — ERYTHROMYCIN 5 MG/G
OINTMENT OPHTHALMIC
Qty: 3.5 G | Refills: 0 | Status: SHIPPED | OUTPATIENT
Start: 2023-12-22

## 2023-12-22 ASSESSMENT — ENCOUNTER SYMPTOMS
EYE PAIN: 1
EYE DISCHARGE: 0
EYE REDNESS: 1
BLURRED VISION: 0
CHILLS: 0
FEVER: 0

## 2023-12-22 ASSESSMENT — PAIN SCALES - GENERAL: PAINLEVEL: 8=MODERATE-SEVERE PAIN

## 2023-12-22 ASSESSMENT — VISUAL ACUITY: OU: 1

## 2023-12-22 NOTE — PROGRESS NOTES
"Subjective     Shady Upton is a 28 y.o. male who presents with Eye Problem (Swelling of right eye x 1 week in half ago )    HPI:  Shady Upton is a 28 y.o. male who presents today for evaluation of right eye pain/swelling.  Patient reports that a little over a week ago he noticed a small painful lump on his right upper eyelid.  Says that after a day or 2 it started to get a bit worse with more redness and surrounding swelling but then after a few days it started to subside again.  After a day or 2, her, started to worsen again with increased redness, swelling, discomfort.  He has been applying warm compresses 3 times a day and using OTC analgesics but it is continuing to worsen.  He has not noticed any discharge.  No fever/chills or visual changes.        Review of Systems   Constitutional:  Negative for chills and fever.   Eyes:  Positive for pain and redness. Negative for blurred vision and discharge.           PMH:  has no past medical history on file.  MEDS:   Current Outpatient Medications:     erythromycin 5 MG/GM Ointment, Instill ~1 cm ribbon into affected eye(s) up to 6 times daily, Disp: 3.5 g, Rfl: 0    doxycycline (VIBRAMYCIN) 100 MG Tab, Take 1 Tablet by mouth 2 times a day for 7 days., Disp: 14 Tablet, Rfl: 0  ALLERGIES: No Known Allergies  SURGHX: No past surgical history on file.  SOCHX:  reports that he has never smoked. He has never used smokeless tobacco. He reports current drug use. Drug: Marijuana. He reports that he does not drink alcohol.  FH: Family history was reviewed, no pertinent findings to report        Objective     /80 (BP Location: Left arm, Patient Position: Sitting, BP Cuff Size: Large adult)   Pulse 77   Temp 36.9 °C (98.4 °F) (Temporal)   Resp 16   Ht 1.778 m (5' 10\")   Wt 102 kg (224 lb 3.2 oz)   SpO2 99%   BMI 32.17 kg/m²      Physical Exam  Constitutional:       General: He is not in acute distress.     Appearance: He is not diaphoretic.   HENT:      " Head: Normocephalic and atraumatic.      Right Ear: External ear normal.      Left Ear: External ear normal.   Eyes:      General: Vision grossly intact.         Right eye: Hordeolum present.      Extraocular Movements: Extraocular movements intact.      Conjunctiva/sclera: Conjunctivae normal.      Pupils: Pupils are equal, round, and reactive to light.      Comments: Right upper eyelid is diffusely edematous with overlying erythema.  Fairly large stye noted more medially with the opening noted internally.  No discharge.  It is exquisitely tender to palpation.   Pulmonary:      Effort: Pulmonary effort is normal. No respiratory distress.   Musculoskeletal:      Cervical back: Normal range of motion.   Skin:     Findings: No rash.   Neurological:      Mental Status: He is alert and oriented to person, place, and time.   Psychiatric:         Mood and Affect: Mood and affect normal.         Cognition and Memory: Memory normal.         Judgment: Judgment normal.             Assessment & Plan     1. Hordeolum internum of right upper eyelid  - erythromycin 5 MG/GM Ointment; Instill ~1 cm ribbon into affected eye(s) up to 6 times daily  Dispense: 3.5 g; Refill: 0  - doxycycline (VIBRAMYCIN) 100 MG Tab; Take 1 Tablet by mouth 2 times a day for 7 days.  Dispense: 14 Tablet; Refill: 0  Patient with what appears to be a stye with infection.  It has been rapidly worsening despite use of moist warm compresses at home.  Given the size of it on today's exam I am not confident that it will resolve with antibiotic ointment and compresses alone.  We will therefore initiate treatment with oral antibiotics as well.  If no significant improvement by next week I would recommend that he come back in for reevaluation.          Differential Diagnosis, natural history, and supportive care discussed. Return to the Urgent Care or follow up with your PCP if symptoms fail to resolve, or for any new or worsening symptoms. Emergency room  precautions discussed. Patient and/or family appears understanding of information.

## 2023-12-23 ENCOUNTER — HOSPITAL ENCOUNTER (EMERGENCY)
Facility: MEDICAL CENTER | Age: 28
End: 2023-12-23
Attending: EMERGENCY MEDICINE
Payer: COMMERCIAL

## 2023-12-23 VITALS
WEIGHT: 224.21 LBS | RESPIRATION RATE: 16 BRPM | HEIGHT: 70 IN | HEART RATE: 72 BPM | BODY MASS INDEX: 32.1 KG/M2 | SYSTOLIC BLOOD PRESSURE: 121 MMHG | OXYGEN SATURATION: 97 % | TEMPERATURE: 99.8 F | DIASTOLIC BLOOD PRESSURE: 69 MMHG

## 2023-12-23 DIAGNOSIS — L03.213 PRESEPTAL CELLULITIS: ICD-10-CM

## 2023-12-23 PROCEDURE — 700102 HCHG RX REV CODE 250 W/ 637 OVERRIDE(OP): Performed by: EMERGENCY MEDICINE

## 2023-12-23 PROCEDURE — 99283 EMERGENCY DEPT VISIT LOW MDM: CPT

## 2023-12-23 PROCEDURE — A9270 NON-COVERED ITEM OR SERVICE: HCPCS | Performed by: EMERGENCY MEDICINE

## 2023-12-23 RX ORDER — CLINDAMYCIN HYDROCHLORIDE 300 MG/1
300 CAPSULE ORAL 3 TIMES DAILY
Qty: 30 CAPSULE | Refills: 0 | Status: ACTIVE | OUTPATIENT
Start: 2023-12-23 | End: 2024-01-02

## 2023-12-23 RX ORDER — CLINDAMYCIN HYDROCHLORIDE 150 MG/1
300 CAPSULE ORAL ONCE
Status: COMPLETED | OUTPATIENT
Start: 2023-12-23 | End: 2023-12-23

## 2023-12-23 RX ADMIN — CLINDAMYCIN HYDROCHLORIDE 300 MG: 150 CAPSULE ORAL at 16:46

## 2023-12-23 NOTE — ED TRIAGE NOTES
Shady Upton  28 y.o. male  Chief Complaint   Patient presents with    Eye Swelling     Right periorbital swelling.  Pt reports swelling began on 12/13. Reports swelling was initially in his eye lid and has spread.       Pt amb to triage with steady gait for above complaint. Pt endorses blurred vision and pain to right eye.  Pt is alert and oriented, speaking in full sentences, follows commands and responds appropriately to questions. Not in any apparent distress. Respirations are even and unlabored.  Pt placed in lobby. Pt educated on triage process. Pt encouraged to alert staff for any changes.

## 2023-12-24 NOTE — ED PROVIDER NOTES
"ED Provider Note    CHIEF COMPLAINT  Chief Complaint   Patient presents with    Eye Swelling     Right periorbital swelling.  Pt reports swelling began on 12/13. Reports swelling was initially in his eye lid and has spread.       EXTERNAL RECORDS REVIEWED  Urgent care visit yesterday, hordeolum right upper eyelid, erythromycin ointment and doxycycline    HPI/ROS    Shady Upton is a 28 y.o. male who presents for evaluation of worsening redness and pain surrounding the right eye.  Yesterday diagnosed the hordeolum and started on doxycycline and some erythromycin ointment.  He states that his vision is intact.  No fever.  He has some pain involving the eyelid and cheek but no eye pain or headache.  No other acute complaints.  Not diabetic.    PAST MEDICAL HISTORY       SURGICAL HISTORY  patient denies any surgical history    FAMILY HISTORY  No family history on file.    SOCIAL HISTORY  Social History     Tobacco Use    Smoking status: Never    Smokeless tobacco: Never   Vaping Use    Vaping Use: Some days    Substances: THC    Devices: Disposable, Pre-filled or refillable cartridge   Substance and Sexual Activity    Alcohol use: No    Drug use: Yes     Types: Marijuana    Sexual activity: Yes     Partners: Male       CURRENT MEDICATIONS  Home Medications       Reviewed by Tomas Dee R.N. (Registered Nurse) on 12/23/23 at 1515  Med List Status: Not Addressed     Medication Last Dose Status   doxycycline (VIBRAMYCIN) 100 MG Tab  Active   erythromycin 5 MG/GM Ointment  Active                    ALLERGIES  No Known Allergies    PHYSICAL EXAM  VITAL SIGNS: /73   Pulse 63   Temp 37.7 °C (99.8 °F) (Temporal)   Resp 16   Ht 1.778 m (5' 10\")   Wt 102 kg (224 lb 3.3 oz)   SpO2 97%   BMI 32.17 kg/m²    Constitutional: Alert in no apparent distress.  HENT: No signs of significant acute trauma.   Eyes: Pupils are equal and reactive.  Injected conjunctiva right eye.  Significant edema and erythema of the " upper eyelid with some extension into the lower eyelid down to the cheek.  No proptosis.  See the photo below.  Chest: Normal nonlabored respirations  Skin: No appreciable rash on the exposed skin  Musculoskeletal: No obvious acute trauma appreciated  Neurologic: Alert, no obvious focal deficits noted.          COURSE & MEDICAL DECISION MAKING    ED Observation Status? No; Patient does not meet criteria for ED Observation.     INITIAL ASSESSMENT, COURSE AND PLAN  Care Narrative: This is a 28-year-old male with a right upper eyelid chalazion, developing periorbital cellulitis.  No history or physical exam findings concerning for orbital cellulitis.  Well-appearing.  Not diabetic.  He has been on doxycycline as prescribed urgent care, I think at this point we will switch him to clindamycin which I think will be more optimal coverage for this infection.  Discharged home in stable condition return instructions discussed at length and he as well as his significant other at the bedside expressed understanding.  Prescription for clindamycin     DISPOSITION AND DISCUSSIONS    Escalation of care considered, and ultimately not performed:diagnostic imaging: I did consider escalation to include a CT scan of the orbits although based on his overall appearance and exam and symptoms I do not feel as though there is a significant chance of an orbital cellulitis at this point      FINAL DIAGNOSIS  1. Preseptal cellulitis           Electronically signed by: Jj Nieto M.D., 12/23/2023 4:35 PM

## 2023-12-24 NOTE — ED NOTES
The patient has been provided with discharge education and information.  The patient was also provided with instructions on follow up care and return precautions.  The patient verbalizes understanding of discharge instructions, follow up care, and return precautions.  All questions have been answered.  clinda RX prescribed by PEGGY.  NAD, A/Mohit, good color and appropriate at time of discharge.  Patient ambulated out of department.